# Patient Record
Sex: MALE | Race: OTHER | Employment: FULL TIME | URBAN - METROPOLITAN AREA
[De-identification: names, ages, dates, MRNs, and addresses within clinical notes are randomized per-mention and may not be internally consistent; named-entity substitution may affect disease eponyms.]

---

## 2022-02-11 PROBLEM — N28.9 KIDNEY DISEASE: Status: ACTIVE | Noted: 2022-02-11

## 2022-03-03 PROBLEM — E78.5 BORDERLINE HYPERLIPIDEMIA: Status: ACTIVE | Noted: 2022-03-03

## 2022-03-18 PROBLEM — E78.5 BORDERLINE HYPERLIPIDEMIA: Status: ACTIVE | Noted: 2022-03-03

## 2022-03-20 PROBLEM — N28.9 KIDNEY DISEASE: Status: ACTIVE | Noted: 2022-02-11

## 2022-05-23 NOTE — PROGRESS NOTES
at 7cm with a full face mask. Most recent download reveals AHI on PAP therapy is 0.5, leak is 2.0 and the hourly usage is 6 hours 38 minutes nightly. The overall use is 584 hours with days greater than four hours at 88/90. The patient is compliant with the Pap therapy and is feeling better as a result. Sleepiness Scale:       Past Medical History:   Diagnosis Date    Chronic low back pain     CKD (chronic kidney disease)     Depression     Hypersomnolence     Migraine     VELMA (obstructive sleep apnea)          Patient Active Problem List   Diagnosis    Sleep apnea with hypersomnolence    Borderline hyperlipidemia    Migraine    Depression    Hypersomnolence    CKD (chronic kidney disease)    VELMA (obstructive sleep apnea)    Kidney disease    Chronic low back pain          History reviewed. No pertinent surgical history. Social History     Socioeconomic History    Marital status:      Spouse name: Not on file    Number of children: Not on file    Years of education: Not on file    Highest education level: Not on file   Occupational History    Not on file   Tobacco Use    Smoking status: Never Smoker    Smokeless tobacco: Never Used   Substance and Sexual Activity    Alcohol use: Not Currently    Drug use: Not Currently    Sexual activity: Not on file   Other Topics Concern    Not on file   Social History Narrative    Not on file     Social Determinants of Health     Financial Resource Strain:     Difficulty of Paying Living Expenses: Not on file   Food Insecurity:     Worried About Running Out of Food in the Last Year: Not on file    Ernestina of Food in the Last Year: Not on file   Transportation Needs:     Lack of Transportation (Medical): Not on file    Lack of Transportation (Non-Medical):  Not on file   Physical Activity:     Days of Exercise per Week: Not on file    Minutes of Exercise per Session: Not on file   Stress:     Feeling of Stress : Not on file Social Connections:     Frequency of Communication with Friends and Family: Not on file    Frequency of Social Gatherings with Friends and Family: Not on file    Attends Mu-ism Services: Not on file    Active Member of Clubs or Organizations: Not on file    Attends Club or Organization Meetings: Not on file    Marital Status: Not on file   Intimate Partner Violence:     Fear of Current or Ex-Partner: Not on file    Emotionally Abused: Not on file    Physically Abused: Not on file    Sexually Abused: Not on file   Housing Stability:     Unable to Pay for Housing in the Last Year: Not on file    Number of [place] in the Last Year: Not on file    Unstable Housing in the Last Year: Not on file         History reviewed. No pertinent family history. No Known Allergies      Current Outpatient Medications   Medication Sig    modafinil (PROVIGIL) 200 MG tablet Take 1 tablet by mouth 2 times daily for 90 days.  escitalopram (LEXAPRO) 10 MG tablet Take 10 mg by mouth daily    rizatriptan (MAXALT) 10 MG tablet Take 10 mg by mouth once as needed    traMADol (ULTRAM) 50 MG tablet Take 50 mg by mouth every 6 hours as needed.  buPROPion (WELLBUTRIN SR) 100 MG extended release tablet Take 100 mg by mouth 2 times daily     No current facility-administered medications for this visit. REVIEW OF SYSTEMS:   CONSTITUTIONAL: Hypersomnolence   There is no history of fever, chills, night sweats, weight loss, weight gain, persistent fatigue   EYES:   Denies problems with eye pain, erythema, blurred vision, or visual field loss. ENTM:   Denies history of tinnitus, epistaxis, sore throat, hoarseness, or dysphonia. LYMPH:   Denies swollen glands. CARDIAC:   No chest pain, pressure, discomfort, palpitations, orthopnea, murmurs, or edema. GI:   No dysphagia, heartburn reflux, nausea/vomiting, diarrhea, abdominal pain, or bleeding.    :   Denies history of dysuria, hematuria, polyuria, or decreased urine output. MS:   No history of myalgias, arthralgias, bone pain, or muscle cramps. SKIN:   No history of rashes, jaundice, cyanosis, nodules, or ulcers. ENDO:   Negative for heat or cold intolerance. No history of DM. PSYCH:   Negative for anxiety, depression, insomnia, hallucinations. NEURO:   There is no history of AMS, persistent headache, decreased level of consciousness, seizures, or motor or sensory deficits. PHYSICAL EXAM:    Vitals:    05/25/22 0849   BP: 114/70   Pulse: 77   Resp: 15   Temp: 96.8 °F (36 °C)   SpO2: 97%        GENERAL APPEARANCE:   The patient is normal weight and in no respiratory distress. HEENT:   PERRL. Conjunctivae unremarkable. Nasal mucosa is without epistaxis, exudate, or polyps. Gums and dentition are unremarkable. There is no oropharyngeal narrowing. TMs are clear. NECK/LYMPHATIC:   Symmetrical with no elevation of jugular venous pulsation. Trachea midline. No thyroid enlargement. No cervical adenopathy. LUNGS:   Normal respiratory effort with symmetrical lung expansion. Breath sounds clear. HEART:   There is a regular rate and rhythm. No murmur, rub, or gallop. There is no edema in the lower extremities. ABDOMEN:   Soft and non-tender. No hepatosplenomegaly. Bowel sounds are normal.     SKIN:   There are no rashes, cyanosis, jaundice, or ecchymosis present. EXTREMITIES:   The extremities are unremarkable without clubbing, cyanosis, joint inflammation, degenerative, or ischemic change. MUSCULOSKELETAL:   There is no abnormal tone, muscle atrophy, or abnormal movement present. NEURO:   The patient is alert and oriented to person, place, and time. Memory appears intact and mood is normal.  No gross sensorimotor deficits are present. ASSESSMENT:  (Medical Decision Making)        Diagnosis Orders   1. VELMA (obstructive sleep apnea) patient originally diagnosed with mild sleep apnea and has been on CPAP since 2013.   He still had continued hypersomnia and is controlled on modafinil. We will renew supplies today and continue current settings modafinil (PROVIGIL) 200 MG tablet    DME - DURABLE MEDICAL EQUIPMENT   2. Iron deficiency -we will check ferritin levels today to see if this is contributing to RLS symptoms. Ferritin   3. RLS (restless legs syndrome) -patient endorses frequent leg movements and this was also seen on sleep study in 2013. He feels that his symptoms are getting worse. We will check ferritin levels but for proceeding with any other treatment Ferritin   4. Hypersomnolence -this is controlled when taking twice daily modafinil. PDMP reviewed with no signs of abuse. We will continue as he has been on this since 2013. modafinil (PROVIGIL) 200 MG tablet          PLAN:  Continue CPAP with nightly compliance. Renew supplies today. Check ferritin levels to see if this is contributing to RLS symptoms. May need to start treatment as this could likely be contributing to hypersomnolence. Refill modafinil 200 mg twice dailyPDMP reviewed with no current signs of abuse. Patient was reminded that we should be the only prescribers of this medication. Appropriate handouts were given to patient including information on sleep apnea, sleep hygiene and PAP therapy.   Follow up with the [facility] will be 3 months or sooner if needed          Orders Placed This Encounter   Procedures    Ferritin     Standing Status:   Future     Number of Occurrences:   1     Standing Expiration Date:   2023   [facility] [address] [facility]  [address] [facility] [address]  [facility] [address]  Dept: [phone]      Patient Name: Nikunj Wang  : 1970  Gender: male  Address: [place] [address] [address] [address]  Phone: [phone]      Primary Insurance: Payor:  EAST / Plan: [employer] EAST  / Product Type: *No Product type* /   Subscriber ID: [id number] - (Other)      AMB Supply Order  Order Details     DME Location: [facility]   Order Date: 5/25/2022   The primary encounter diagnosis was VELMA (obstructive sleep apnea). Diagnoses of Iron deficiency, RLS (restless legs syndrome), and Hypersomnolence were also pertinent to this visit. (  X   )Supplies Needed       CPAPMachine   ( x   ) CPAP Unit  (     ) Auto CPAP Unit  (     ) BiLevel Unit  (     ) Auto BiLevel Unit  (     ) ASV   (     ) Bilevel ST      Length of need: 12 months    Pressure:  7 cmH20  EPR: 3     Starting Ramp Pressure:   cm H20  Ramp Time: min      Patient had a diagnostic Apnea Hypopnea Index (AHI) of :  5.3  *SUPPLIES* Replace all as needed, or per coverage guidelines     Masks Type:  ( x   ) -Full Face Mask (1 per 3 mon)  (  x  ) -Full Mask (1 per month) Interface/Cushion      (  ) -Nasal Mask (1 per 3 mon)  (  ) - Nasal Mask (1 per month) Interface/Cushion  (     ) -Pillow (2 per mon)  (     ) -Blxbkrnak (1 per 6 mon)            Other Supplies:    (   X  )-Tvgonvjy (1 per 6 mon)  (   X  )-Mxkwyo Tubing (1 per 3 mon)  (   X  )- Disposable Filter (2 per mon)  (   x  )-Cicryy Humidifier (1 per year)     ( x    )-Xzkmfmxde (sometimes used with Full Face Mask) (1 per 6 mos)  (    )-Tubing-without heat (1 per 3 mos)  (     )-Non-Disposable Filter (1 per 6 mos)  (  x   )-Water Chamber (1 per 6 mos)  (     )-Humidifier non-heated (1 per 5 yrs)      Signed Date: 5/25/2022  Electronically Signed By: VANESSA Cabral CNP  Electronically Dated:  5/25/2022      Orders Placed This Encounter   Medications    modafinil (PROVIGIL) 200 MG tablet     Sig: Take 1 tablet by mouth 2 times daily for 90 days.      Dispense:  60 tablet     Refill:  2      Collaborating Physician: Dr. Kamar Pride     Over 50% of today's office visit was spent in face to face time reviewing test results, prognosis, importance of compliance, education about disease process, benefits of medications, instructions for management of acute flare-ups, and follow up plans. Total face to face time spent with patient was 40 minutes.     VANESSA Mccloud CNP  Electronically signed

## 2022-05-25 ENCOUNTER — TELEPHONE (OUTPATIENT)
Dept: SLEEP MEDICINE | Age: 52
End: 2022-05-25

## 2022-05-25 ENCOUNTER — OFFICE VISIT (OUTPATIENT)
Dept: SLEEP MEDICINE | Age: 52
End: 2022-05-25
Payer: OTHER GOVERNMENT

## 2022-05-25 VITALS
HEART RATE: 77 BPM | WEIGHT: 196 LBS | OXYGEN SATURATION: 97 % | SYSTOLIC BLOOD PRESSURE: 114 MMHG | BODY MASS INDEX: 26.55 KG/M2 | HEIGHT: 72 IN | RESPIRATION RATE: 15 BRPM | TEMPERATURE: 96.8 F | DIASTOLIC BLOOD PRESSURE: 70 MMHG

## 2022-05-25 DIAGNOSIS — E61.1 IRON DEFICIENCY: ICD-10-CM

## 2022-05-25 DIAGNOSIS — G47.10 HYPERSOMNOLENCE: ICD-10-CM

## 2022-05-25 DIAGNOSIS — G25.81 RLS (RESTLESS LEGS SYNDROME): ICD-10-CM

## 2022-05-25 DIAGNOSIS — G47.33 OSA (OBSTRUCTIVE SLEEP APNEA): Primary | ICD-10-CM

## 2022-05-25 LAB — FERRITIN SERPL-MCNC: 56 NG/ML (ref 8–388)

## 2022-05-25 PROCEDURE — 99203 OFFICE O/P NEW LOW 30 MIN: CPT | Performed by: STUDENT IN AN ORGANIZED HEALTH CARE EDUCATION/TRAINING PROGRAM

## 2022-05-25 RX ORDER — MODAFINIL 200 MG/1
200 TABLET ORAL 2 TIMES DAILY
Qty: 60 TABLET | Refills: 2 | Status: SHIPPED | OUTPATIENT
Start: 2022-05-25 | End: 2022-08-23

## 2022-05-25 RX ORDER — BUPROPION HYDROCHLORIDE 100 MG/1
100 TABLET, EXTENDED RELEASE ORAL 2 TIMES DAILY
COMMUNITY

## 2022-05-25 ASSESSMENT — SLEEP AND FATIGUE QUESTIONNAIRES
HOW LIKELY ARE YOU TO NOD OFF OR FALL ASLEEP WHILE SITTING QUIETLY AFTER LUNCH WITHOUT ALCOHOL: 2
ESS TOTAL SCORE: 12
HOW LIKELY ARE YOU TO NOD OFF OR FALL ASLEEP WHILE SITTING INACTIVE IN A PUBLIC PLACE: 0
HOW LIKELY ARE YOU TO NOD OFF OR FALL ASLEEP IN A CAR, WHILE STOPPED FOR A FEW MINUTES IN TRAFFIC: 0
HOW LIKELY ARE YOU TO NOD OFF OR FALL ASLEEP WHILE LYING DOWN TO REST IN THE AFTERNOON WHEN CIRCUMSTANCES PERMIT: 3
HOW LIKELY ARE YOU TO NOD OFF OR FALL ASLEEP WHILE WATCHING TV: 3
HOW LIKELY ARE YOU TO NOD OFF OR FALL ASLEEP WHILE SITTING AND TALKING TO SOMEONE: 0
HOW LIKELY ARE YOU TO NOD OFF OR FALL ASLEEP WHILE SITTING AND READING: 1
HOW LIKELY ARE YOU TO NOD OFF OR FALL ASLEEP WHEN YOU ARE A PASSENGER IN A CAR FOR AN HOUR WITHOUT A BREAK: 3

## 2022-05-25 NOTE — PATIENT INSTRUCTIONS
The company who will be taking care of your CPAP supplies is:     Address: [address] [place] [address] [address], [place], [address] [place] [address]  Phone: [phone] Option #1  Fax: [phone]

## 2022-05-25 NOTE — TELEPHONE ENCOUNTER
----- Message from VANESSA Rosario CNP sent at 5/25/2022  1:44 PM EDT -----  Patient called and notified of low ferritin. Ferritin needs to be replaced. Start ferrous sulfate 325 mg one tablet BID x 1 month, then one tablet daily. Take ferrous sulfate with Vit C to aid in absorption. Ferrous sulfate can cause constipation and a stool softener may be needed. We will check ferritin levels at next visit.

## 2022-09-08 NOTE — PROGRESS NOTES
[address] [place] [address] Court, [address] [facility]  [phone]    Patient Name:  Carolyn Bradford  YOB: 1970      Office Visit 9/16/2022    Chief Complaint   Patient presents with    CPAP/BiPAP    Sleep Apnea    Medication Refill     modafinil       HISTORY OF PRESENT ILLNESS:    This pleasant gentleman came in today for a follow-up visit. Patient is known to me, and works at [employer].    Patient is currently on CPAP at 7 cm H2O with C-Flex of 3 and using a F 10 fullface mask. Compliance data shows he is using 112 at 114 days in the 110 days of more than 4 hours. Average hours use 6 hours and 32 minutes. AHI is down to 0.5. Mean airleak is 0 L/min and 95 percentile air leak is 1.6 L/min. He currently reports he has no problems with the airway pressure or with humidity. He does indicate the mask sometimes irritates the bridge of his nose. Patient also has idiopathic hypersomnia with an MSLT in the past that was negative for narcolepsy. He is currently taking Provigil 200 mg twice a day. Indicates he always takes his morning dose, but sometimes forgets to take his afternoon dose and is very tired. As a result he does consume a fair amount of coffee when that happens. He has not tried Nuvigl in the past.  Patient's [place] score is still elevated at about 11/12. During his last visit with [facility], patient was having problems with limb movements at nighttime. The ferritin level in the past was only 56 and because of iron deficiency, he has been started on iron replacement therapy with a glass of vitamin C in the morning. However he indicates he has forgotten to take it sometimes. He has not noticed any significant improvement at nighttime. He has not had his lab values checked again. He currently reports he goes to bed about 1030 and wakes about 530 and during the weekdays he feels fine every day.   On the weekends he may sleep longer indicates he feels worse both days. As a result he only tries to sleep maximum about 6-1/2 to 7 hours    He does report he is taking Lexapro dose was increased from 5 mg 7 and half milligrams. The [facility] is helping him with this. He also does take some tramadol about once a month for issues with his back, joints. But he does not try to take any on a regular basis. Sleep Medicine 9/16/2022 5/25/2022   Sitting and reading 2 1   Watching TV 1 3   Sitting, inactive in a public place (e.g. a theatre or a meeting) 2 0   As a passenger in a car for an hour without a break 3 3   Lying down to rest in the afternoon when circumstances permit 0 3   Sitting and talking to someone 1 0   Sitting quietly after a lunch without alcohol 2 2   In a car, while stopped for a few minutes in traffic 0 0   [place] Sleepiness Score 11 12     DIAGNOSTIC TESTS:    No flowsheet data found. Past Medical History:   Diagnosis Date    Chronic low back pain     CKD (chronic kidney disease)     Depression     Hypersomnolence     Migraine     VELMA (obstructive sleep apnea)          Patient Active Problem List   Diagnosis    Sleep apnea with hypersomnolence    Borderline hyperlipidemia    Migraine    Depression    Hypersomnolence    CKD (chronic kidney disease)    VELMA (obstructive sleep apnea)    Kidney disease    Chronic low back pain           History reviewed. No pertinent surgical history.       Social History     Socioeconomic History    Marital status:      Spouse name: Not on file    Number of children: Not on file    Years of education: Not on file    Highest education level: Not on file   Occupational History    Not on file   Tobacco Use    Smoking status: Never    Smokeless tobacco: Never   Substance and Sexual Activity    Alcohol use: Not Currently    Drug use: Not Currently    Sexual activity: Not on file   Other Topics Concern    Not on file   Social History Narrative    Not on file     Social Determinants of Health     Financial Resource Strain: Not on file   Food Insecurity: Not on file   Transportation Needs: Not on file   Physical Activity: Not on file   Stress: Not on file   Social Connections: Not on file   Intimate Partner Violence: Not on file   Housing Stability: Not on file         History reviewed. No pertinent family history. No Known Allergies      Current Outpatient Medications   Medication Sig    Armodafinil 250 MG TABS Take 250 mg by mouth daily for 30 days. buPROPion (WELLBUTRIN SR) 100 MG extended release tablet Take 100 mg by mouth 2 times daily    escitalopram (LEXAPRO) 10 MG tablet Take 10 mg by mouth daily    rizatriptan (MAXALT) 10 MG tablet Take 10 mg by mouth once as needed    traMADol (ULTRAM) 50 MG tablet Take 50 mg by mouth every 6 hours as needed. No current facility-administered medications for this visit. REVIEW OF SYSTEMS:     CONSTITUTIONAL:   There is Negative history of fever, chills, night sweats. Patient is  Negativefor weight loss, they are  Negative for  weight gain. Patient is  Positive  for fatigue and hypersomnia and is  on their CPAP. Insomnia is   under control. CARDIAC:   No chest pain, pressure, discomfort, palpitations, orthopnea, murmurs, or edema. GI:   No dysphagia, heartburn reflux, nausea/vomiting, diarrhea, abdominal pain, or bleeding. NEURO:    There is no history of AMS, persistent headache, decreased level of consciousness, seizures, or motor or sensory deficits. Musculoskeletal:  Positive for back and joint issues. Positive for increased limb movements            PHYSICAL EXAM:    /66   Pulse 64   Temp 97.7 °F (36.5 °C)   Resp 18   Ht 6' (1.829 m)   Wt 196 lb 8 oz (89.1 kg)   SpO2 96%   BMI 26.65 kg/m²     Body mass index is 26.65 kg/m².     Constitutional:  the patient is well developed and in no acute distress  EENMT:  Sclera clear, pupils equal, oral mucosa moist, narrow oral airway Modified Jang Stage 4, Nares are patent bilateral  Respiratory: CTA b/l. No Wheezing or Rhonchi. Cardiovascular:  RRR without M,G,R  Gastrointestinal: soft and non-tender; with positive bowel sounds. Musculoskeletal: warm without cyanosis. There is No lower leg edema. Skin:  no jaundice or rashes, no visible wounds   Neurologic: no gross neuro deficits     Psychiatric:  alert and oriented x 3        ASSESSMENT/PLAN:  (Medical Decision Making)       ICD-10-CM    1. VELMA (obstructive sleep apnea)  G47.33 Armodafinil 250 MG TABS    -Patient has excellent compliance with PAP therapy. Currently reports machine is indicating that it is exceeded his life capacity. He has an old S9 machine. He is wondering if he needs to get a new machine indicated he does. We will order an S 11 machine for him.  -He is benefiting and will renew supplies      2. RLS (restless legs syndrome)  G25.81 Ferritin  --We will check his ferritin level again to see if it is low. If the value is low, will send him to hematology for further assessment. However if the level is adequate and still not having any improvement in his symptoms we will start him on gabapentin. Please note this can also do some hypersomnia. 3. Iron deficiency  E61.1 Ferritin    --See above      4. Hypersomnolence  G47.10 Armodafinil 250 MG TABS    --Patient does have idiopathic hypersomnia despite excellent compliance with PAP therapy. We will change him from Provigil twice daily to Nuvigil 250 mg and see how he does. SUMMARY:           Continue CPAP. Order a new machine-S 6 with a pressure of 7 cm H2O with C-Flex of 3 starting pressure of 5 and a 10-minute ramp with humidification    Supplies Renewed-changed to a Vitera fullface mask    Patient is compliant and benefit benefiting from CPAP. Changed to Nuvigil 250 mg daily from Provigil 200 twice daily  Did go to the [employer].   Patient is using schedule II medications appropriately with no evidence of abuse/misuse. New medications ordered appropriately      Continue proper sleep hygiene. Check ferritin level, see above recommendations    Did review Reading score, and compliance data      All questions are answered to the patient's satisfaction. The patient will call for further questions and concerns. Time spent was 40 minutes    Follow up at the [address] [place] [address] will be in 3 months to see how he is doing particularly with limb movements and changes to Nuvigil  Follow up will be with the patient's referring physician as had been previously scheduled. Steve Mcdowell MD    Over 50% of today's office visit was spent in face to face time reviewing test results, prognosis, importance of compliance, education about disease process, benefits of medications, instructions for management of acute flare-ups, and follow up plans. Electronically signed and dictated. Please note if there are errors this is  likely a result of the dictation software. Orders Placed This Encounter   Procedures    Ferritin     Standing Status:   Future     Standing Expiration Date:   9/16/2023      Orders Placed This Encounter   Medications    Armodafinil 250 MG TABS     Sig: Take 250 mg by mouth daily for 30 days.      Dispense:  30 tablet     Refill:  3

## 2022-09-16 ENCOUNTER — OFFICE VISIT (OUTPATIENT)
Dept: SLEEP MEDICINE | Age: 52
End: 2022-09-16
Payer: OTHER GOVERNMENT

## 2022-09-16 VITALS
TEMPERATURE: 97.7 F | BODY MASS INDEX: 26.61 KG/M2 | OXYGEN SATURATION: 96 % | DIASTOLIC BLOOD PRESSURE: 66 MMHG | WEIGHT: 196.5 LBS | HEART RATE: 64 BPM | SYSTOLIC BLOOD PRESSURE: 124 MMHG | HEIGHT: 72 IN | RESPIRATION RATE: 18 BRPM

## 2022-09-16 DIAGNOSIS — G47.10 HYPERSOMNOLENCE: ICD-10-CM

## 2022-09-16 DIAGNOSIS — G25.81 RLS (RESTLESS LEGS SYNDROME): ICD-10-CM

## 2022-09-16 DIAGNOSIS — G47.33 OSA (OBSTRUCTIVE SLEEP APNEA): Primary | ICD-10-CM

## 2022-09-16 DIAGNOSIS — E61.1 IRON DEFICIENCY: ICD-10-CM

## 2022-09-16 PROCEDURE — 99215 OFFICE O/P EST HI 40 MIN: CPT | Performed by: INTERNAL MEDICINE

## 2022-09-16 RX ORDER — ARMODAFINIL 250 MG/1
250 TABLET ORAL DAILY
Qty: 30 TABLET | Refills: 3 | Status: SHIPPED | OUTPATIENT
Start: 2022-09-16 | End: 2022-10-16

## 2022-09-16 RX ORDER — MODAFINIL 200 MG/1
TABLET ORAL
COMMUNITY
Start: 2022-09-08 | End: 2022-09-16

## 2022-09-16 ASSESSMENT — SLEEP AND FATIGUE QUESTIONNAIRES
HOW LIKELY ARE YOU TO NOD OFF OR FALL ASLEEP WHILE WATCHING TV: 1
HOW LIKELY ARE YOU TO NOD OFF OR FALL ASLEEP WHILE SITTING AND READING: 2
HOW LIKELY ARE YOU TO NOD OFF OR FALL ASLEEP IN A CAR, WHILE STOPPED FOR A FEW MINUTES IN TRAFFIC: 0
ESS TOTAL SCORE: 11
HOW LIKELY ARE YOU TO NOD OFF OR FALL ASLEEP WHILE SITTING INACTIVE IN A PUBLIC PLACE: 2
HOW LIKELY ARE YOU TO NOD OFF OR FALL ASLEEP WHILE LYING DOWN TO REST IN THE AFTERNOON WHEN CIRCUMSTANCES PERMIT: 0
HOW LIKELY ARE YOU TO NOD OFF OR FALL ASLEEP WHEN YOU ARE A PASSENGER IN A CAR FOR AN HOUR WITHOUT A BREAK: 3
HOW LIKELY ARE YOU TO NOD OFF OR FALL ASLEEP WHILE SITTING AND TALKING TO SOMEONE: 1
HOW LIKELY ARE YOU TO NOD OFF OR FALL ASLEEP WHILE SITTING QUIETLY AFTER LUNCH WITHOUT ALCOHOL: 2